# Patient Record
Sex: FEMALE | Race: WHITE | Employment: PART TIME | URBAN - METROPOLITAN AREA
[De-identification: names, ages, dates, MRNs, and addresses within clinical notes are randomized per-mention and may not be internally consistent; named-entity substitution may affect disease eponyms.]

---

## 2023-11-15 ENCOUNTER — HOSPITAL ENCOUNTER (EMERGENCY)
Facility: CLINIC | Age: 23
Discharge: HOME | End: 2023-11-15
Attending: EMERGENCY MEDICINE
Payer: COMMERCIAL

## 2023-11-15 ENCOUNTER — APPOINTMENT (OUTPATIENT)
Dept: GENERAL RADIOLOGY | Facility: CLINIC | Age: 23
End: 2023-11-15
Attending: EMERGENCY MEDICINE
Payer: COMMERCIAL

## 2023-11-15 VITALS
BODY MASS INDEX: 49.09 KG/M2 | HEIGHT: 61 IN | SYSTOLIC BLOOD PRESSURE: 149 MMHG | WEIGHT: 260 LBS | RESPIRATION RATE: 18 BRPM | OXYGEN SATURATION: 98 % | HEART RATE: 121 BPM | DIASTOLIC BLOOD PRESSURE: 90 MMHG | TEMPERATURE: 97.3 F

## 2023-11-15 DIAGNOSIS — S89.82XA HYPEREXTENSION INJURY OF KNEE, LEFT, INITIAL ENCOUNTER: Primary | ICD-10-CM

## 2023-11-15 PROCEDURE — 73562 X-RAY EXAM OF KNEE 3: CPT

## 2023-11-15 PROCEDURE — 6370000000 HC RX 637 (ALT 250 FOR IP): Performed by: EMERGENCY MEDICINE

## 2023-11-15 PROCEDURE — 99283 EMERGENCY DEPT VISIT LOW MDM: CPT

## 2023-11-15 RX ORDER — IBUPROFEN 200 MG
400 TABLET ORAL ONCE
Status: COMPLETED | OUTPATIENT
Start: 2023-11-15 | End: 2023-11-15

## 2023-11-15 RX ORDER — ETONOGESTREL AND ETHINYL ESTRADIOL VAGINAL .015; .12 MG/D; MG/D
1 RING VAGINAL SEE ADMIN INSTRUCTIONS
COMMUNITY

## 2023-11-15 RX ADMIN — IBUPROFEN 400 MG: 200 TABLET, FILM COATED ORAL at 22:27

## 2023-11-15 ASSESSMENT — PAIN SCALES - GENERAL
PAINLEVEL_OUTOF10: 8
PAINLEVEL_OUTOF10: 8

## 2023-11-15 ASSESSMENT — PAIN DESCRIPTION - LOCATION: LOCATION: KNEE

## 2023-11-15 ASSESSMENT — PAIN DESCRIPTION - ORIENTATION: ORIENTATION: LEFT

## 2023-11-15 ASSESSMENT — PAIN - FUNCTIONAL ASSESSMENT: PAIN_FUNCTIONAL_ASSESSMENT: 0-10

## 2023-11-16 NOTE — DISCHARGE INSTRUCTIONS
Use Ace wrap when walking around or sleeping. Must follow-up with your company's work injury provider right away for evaluation as you may need to see an orthopedic specialist.  I cannot be sure that you have not injured any structures in your knee such as your cartilage or your MCL. Is Motrin for pain. You will be on work restrictions involving sitting only type job until fully healed and cleared.

## 2023-11-16 NOTE — ED NOTES
Patient came in ambulatory from work. At 2050 patient fell at work because the floor was wet. Patient came in here due to pain in her left knee. Patient came in limping but did not need assistive device.       Lexie Veronica RN  11/15/23 1938

## 2023-12-08 SDOH — HEALTH STABILITY: PHYSICAL HEALTH: ON AVERAGE, HOW MANY DAYS PER WEEK DO YOU ENGAGE IN MODERATE TO STRENUOUS EXERCISE (LIKE A BRISK WALK)?: 3 DAYS

## 2023-12-11 ENCOUNTER — OFFICE VISIT (OUTPATIENT)
Dept: FAMILY MEDICINE CLINIC | Age: 23
End: 2023-12-11
Payer: COMMERCIAL

## 2023-12-11 ENCOUNTER — HOSPITAL ENCOUNTER (OUTPATIENT)
Age: 23
Discharge: HOME | End: 2023-12-11
Payer: COMMERCIAL

## 2023-12-11 VITALS
TEMPERATURE: 97.3 F | WEIGHT: 293 LBS | BODY MASS INDEX: 53.92 KG/M2 | OXYGEN SATURATION: 99 % | HEIGHT: 62 IN | HEART RATE: 99 BPM | DIASTOLIC BLOOD PRESSURE: 74 MMHG | SYSTOLIC BLOOD PRESSURE: 122 MMHG | RESPIRATION RATE: 16 BRPM

## 2023-12-11 DIAGNOSIS — Z00.00 PREVENTATIVE HEALTH CARE: Primary | ICD-10-CM

## 2023-12-11 DIAGNOSIS — Z00.00 PREVENTATIVE HEALTH CARE: ICD-10-CM

## 2023-12-11 DIAGNOSIS — R63.5 WEIGHT GAIN: ICD-10-CM

## 2023-12-11 DIAGNOSIS — E55.9 VITAMIN D DEFICIENCY: ICD-10-CM

## 2023-12-11 PROBLEM — L70.9 ACNE: Status: ACTIVE | Noted: 2017-01-27

## 2023-12-11 LAB
ALBUMIN SERPL-MCNC: 4 G/DL (ref 3.5–5.2)
ALBUMIN/GLOB SERPL: 1.1 {RATIO} (ref 1–2.5)
ALP SERPL-CCNC: 66 U/L (ref 35–104)
ALT SERPL-CCNC: 11 U/L (ref 5–33)
ANION GAP SERPL CALCULATED.3IONS-SCNC: 10 MMOL/L (ref 9–17)
AST SERPL-CCNC: 13 U/L
BILIRUB SERPL-MCNC: <0.1 MG/DL (ref 0.3–1.2)
BUN SERPL-MCNC: 10 MG/DL (ref 6–20)
CALCIUM SERPL-MCNC: 9.3 MG/DL (ref 8.6–10.4)
CHLORIDE SERPL-SCNC: 106 MMOL/L (ref 98–107)
CHOLEST SERPL-MCNC: 126 MG/DL
CHOLESTEROL/HDL RATIO: 3.2
CO2 SERPL-SCNC: 24 MMOL/L (ref 20–31)
CREAT SERPL-MCNC: 0.7 MG/DL (ref 0.5–0.9)
ERYTHROCYTE [DISTWIDTH] IN BLOOD BY AUTOMATED COUNT: 13.3 % (ref 11.8–14.4)
EST. AVERAGE GLUCOSE BLD GHB EST-MCNC: 103 MG/DL
GFR SERPL CREATININE-BSD FRML MDRD: >60 ML/MIN/1.73M2
GLUCOSE SERPL-MCNC: 98 MG/DL (ref 70–99)
HBA1C MFR BLD: 5.2 % (ref 4–6)
HCT VFR BLD AUTO: 41.1 % (ref 36.3–47.1)
HDLC SERPL-MCNC: 39 MG/DL
HGB BLD-MCNC: 13.5 G/DL (ref 11.9–15.1)
LDLC SERPL CALC-MCNC: 66 MG/DL (ref 0–130)
MCH RBC QN AUTO: 27.9 PG (ref 25.2–33.5)
MCHC RBC AUTO-ENTMCNC: 32.8 G/DL (ref 28.4–34.8)
MCV RBC AUTO: 84.9 FL (ref 82.6–102.9)
NRBC BLD-RTO: 0 PER 100 WBC
PLATELET # BLD AUTO: 402 K/UL (ref 138–453)
PMV BLD AUTO: 9.7 FL (ref 8.1–13.5)
POTASSIUM SERPL-SCNC: 4 MMOL/L (ref 3.7–5.3)
PROT SERPL-MCNC: 7.6 G/DL (ref 6.4–8.3)
RBC # BLD AUTO: 4.84 M/UL (ref 3.95–5.11)
SODIUM SERPL-SCNC: 140 MMOL/L (ref 135–144)
TRIGL SERPL-MCNC: 104 MG/DL
TSH SERPL DL<=0.05 MIU/L-ACNC: 1.73 UIU/ML (ref 0.3–5)
WBC OTHER # BLD: 7.8 K/UL (ref 3.5–11.3)

## 2023-12-11 PROCEDURE — 36415 COLL VENOUS BLD VENIPUNCTURE: CPT

## 2023-12-11 PROCEDURE — 80053 COMPREHEN METABOLIC PANEL: CPT

## 2023-12-11 PROCEDURE — G8484 FLU IMMUNIZE NO ADMIN: HCPCS | Performed by: NURSE PRACTITIONER

## 2023-12-11 PROCEDURE — 84443 ASSAY THYROID STIM HORMONE: CPT

## 2023-12-11 PROCEDURE — 83036 HEMOGLOBIN GLYCOSYLATED A1C: CPT

## 2023-12-11 PROCEDURE — 80061 LIPID PANEL: CPT

## 2023-12-11 PROCEDURE — 85027 COMPLETE CBC AUTOMATED: CPT

## 2023-12-11 PROCEDURE — 82306 VITAMIN D 25 HYDROXY: CPT

## 2023-12-11 PROCEDURE — 99385 PREV VISIT NEW AGE 18-39: CPT | Performed by: NURSE PRACTITIONER

## 2023-12-11 SDOH — ECONOMIC STABILITY: INCOME INSECURITY: HOW HARD IS IT FOR YOU TO PAY FOR THE VERY BASICS LIKE FOOD, HOUSING, MEDICAL CARE, AND HEATING?: NOT HARD AT ALL

## 2023-12-11 SDOH — ECONOMIC STABILITY: FOOD INSECURITY: WITHIN THE PAST 12 MONTHS, YOU WORRIED THAT YOUR FOOD WOULD RUN OUT BEFORE YOU GOT MONEY TO BUY MORE.: NEVER TRUE

## 2023-12-11 SDOH — ECONOMIC STABILITY: HOUSING INSECURITY
IN THE LAST 12 MONTHS, WAS THERE A TIME WHEN YOU DID NOT HAVE A STEADY PLACE TO SLEEP OR SLEPT IN A SHELTER (INCLUDING NOW)?: NO

## 2023-12-11 SDOH — ECONOMIC STABILITY: FOOD INSECURITY: WITHIN THE PAST 12 MONTHS, THE FOOD YOU BOUGHT JUST DIDN'T LAST AND YOU DIDN'T HAVE MONEY TO GET MORE.: NEVER TRUE

## 2023-12-11 ASSESSMENT — PATIENT HEALTH QUESTIONNAIRE - PHQ9
SUM OF ALL RESPONSES TO PHQ QUESTIONS 1-9: 0
1. LITTLE INTEREST OR PLEASURE IN DOING THINGS: 0
SUM OF ALL RESPONSES TO PHQ QUESTIONS 1-9: 0
SUM OF ALL RESPONSES TO PHQ QUESTIONS 1-9: 0
2. FEELING DOWN, DEPRESSED OR HOPELESS: 0
SUM OF ALL RESPONSES TO PHQ QUESTIONS 1-9: 0
SUM OF ALL RESPONSES TO PHQ9 QUESTIONS 1 & 2: 0

## 2023-12-11 ASSESSMENT — ENCOUNTER SYMPTOMS
DIARRHEA: 0
NAUSEA: 0
ABDOMINAL DISTENTION: 0
CONSTIPATION: 0
SORE THROAT: 0
SHORTNESS OF BREATH: 0
RHINORRHEA: 0
COLOR CHANGE: 0
CHEST TIGHTNESS: 0
BACK PAIN: 0
COUGH: 0
ABDOMINAL PAIN: 0

## 2023-12-11 NOTE — PROGRESS NOTES
Basilia Ellis, APRN-CNP  [address] [facility]  [address] [address] [facility], [address] [facility] [address]  Dept: [phone]  Dept Fax: [phone]     Patient ID: Jessy Haji is a 21 y.o. female. HPI    Jessy Haji is a 21 y.o. female New patient who presents to the office today for a first visit and to establish a relationship with a new primary care provider. Previous PCP: Marline Garcia with [facility] last seen: while    Today, the patient complains of weight gain. She has been trying to lose weight but continues to gain weight, she would like to try something to help her get going, as she feels she is at a stand still. She is trying to eat better. Preventative care, female:  Last PAP: 9/2023  Nicotine use: never  Alcohol use: yes, socially   Drug use: never  Dental exam: sept, 2023  Eye exam: glasses, a while     Specialists:  Michael    Previous office notes, labs, imaging and hospital records were reviewed prior to and during encounter. The patient's past medical, surgical, social, and family history as well as her current medications and allergies were reviewed as documented in today's encounter by JULIAN Bergeron. Current Outpatient Medications on File Prior to Visit   Medication Sig Dispense Refill    etonogestrel-ethinyl estradiol (NUVARING) 0.12-0.015 MG/24HR vaginal ring Place 1 each vaginally See Admin Instructions       No current facility-administered medications on file prior to visit. Subjective:     Review of Systems   Constitutional:  Negative for activity change, fatigue and fever. HENT:  Negative for congestion, ear pain, rhinorrhea and sore throat. Respiratory:  Negative for cough, chest tightness and shortness of breath. Cardiovascular:  Negative for chest pain and palpitations. Gastrointestinal:  Negative for abdominal distention, abdominal pain, constipation, diarrhea and nausea.    Endocrine: Negative for polydipsia, polyphagia and

## 2023-12-11 NOTE — PATIENT INSTRUCTIONS
- increase protein intake, smaller portion sizes, 4-6 small meals daily. - Lifestyle changes: Decrease fats, sugars, carbohydrates, and increase routine exercise, try to get 150 minutes of aerobic activity a week.

## 2023-12-13 LAB — 25(OH)D3 SERPL-MCNC: 19.9 NG/ML (ref 30–100)

## 2023-12-13 RX ORDER — ERGOCALCIFEROL 1.25 MG/1
50000 CAPSULE ORAL WEEKLY
Qty: 4 CAPSULE | Refills: 3 | Status: SHIPPED | OUTPATIENT
Start: 2023-12-13

## 2023-12-29 NOTE — PROGRESS NOTES
Amelia Alvarez, APRN-CNP  [facility]  [facility] MEDICINE  [address], [address]  [facility] [address]  Dept: [phone]  Dept Fax: [phone]     Patient ID: Barb Burk is a 23 y.o. female.    HPI    Pt here today for BP and weight check wanting to start adipex.  A refill of the medication is needed today. Pt denies any fever or chills.  Pt today denies any HA, chest pain, or SOB.  Pt denies any N/V/D/C or abdominal pain.   Otherwise patient is doing well on current tx and voices no other concerns today.     The patient's past medical, surgical, social, and family history as well as his current medications and allergies were reviewed as documented in today's encounter by JULIAN Ludwig.    My previous office notes, labs and diagnostic studies were reviewed prior to and during encounter.    Current Outpatient Medications on File Prior to Visit   Medication Sig Dispense Refill    vitamin D (ERGOCALCIFEROL) 1.25 MG (74423 UT) CAPS capsule Take 1 capsule by mouth once a week 4 capsule 3    etonogestrel-ethinyl estradiol (NUVARING) 0.12-0.015 MG/24HR vaginal ring Place 1 each vaginally See Admin Instructions       No current facility-administered medications on file prior to visit.        Subjective:     Review of Systems   Constitutional:  Negative for activity change, fatigue and fever.   HENT:  Negative for congestion, ear pain, rhinorrhea and sore throat.    Respiratory:  Negative for cough, chest tightness and shortness of breath.    Cardiovascular:  Negative for chest pain and palpitations.   Gastrointestinal:  Negative for abdominal distention, abdominal pain, constipation, diarrhea and nausea.   Endocrine: Negative for polydipsia, polyphagia and polyuria.   Genitourinary:  Negative for difficulty urinating and dysuria.   Musculoskeletal:  Negative for arthralgias, back pain and myalgias.   Skin:  Negative for color change and rash.   Neurological:  Negative

## 2024-01-03 ENCOUNTER — OFFICE VISIT (OUTPATIENT)
Dept: FAMILY MEDICINE CLINIC | Age: 24
End: 2024-01-03
Payer: COMMERCIAL

## 2024-01-03 VITALS
DIASTOLIC BLOOD PRESSURE: 76 MMHG | SYSTOLIC BLOOD PRESSURE: 124 MMHG | WEIGHT: 293 LBS | OXYGEN SATURATION: 97 % | BODY MASS INDEX: 53.96 KG/M2 | RESPIRATION RATE: 16 BRPM | HEART RATE: 82 BPM | TEMPERATURE: 98.1 F

## 2024-01-03 DIAGNOSIS — R63.5 WEIGHT GAIN: ICD-10-CM

## 2024-01-03 PROCEDURE — G8417 CALC BMI ABV UP PARAM F/U: HCPCS | Performed by: NURSE PRACTITIONER

## 2024-01-03 PROCEDURE — G8427 DOCREV CUR MEDS BY ELIG CLIN: HCPCS | Performed by: NURSE PRACTITIONER

## 2024-01-03 PROCEDURE — 1036F TOBACCO NON-USER: CPT | Performed by: NURSE PRACTITIONER

## 2024-01-03 PROCEDURE — 99213 OFFICE O/P EST LOW 20 MIN: CPT | Performed by: NURSE PRACTITIONER

## 2024-01-03 PROCEDURE — G8484 FLU IMMUNIZE NO ADMIN: HCPCS | Performed by: NURSE PRACTITIONER

## 2024-01-03 RX ORDER — PHENTERMINE HYDROCHLORIDE 37.5 MG/1
37.5 CAPSULE ORAL EVERY MORNING
Qty: 30 CAPSULE | Refills: 0 | Status: SHIPPED | OUTPATIENT
Start: 2024-01-03 | End: 2024-02-02

## 2024-01-03 ASSESSMENT — ENCOUNTER SYMPTOMS
CONSTIPATION: 0
NAUSEA: 0
SORE THROAT: 0
CHEST TIGHTNESS: 0
COUGH: 0
ABDOMINAL DISTENTION: 0
ABDOMINAL PAIN: 0
BACK PAIN: 0
RHINORRHEA: 0
DIARRHEA: 0
SHORTNESS OF BREATH: 0
COLOR CHANGE: 0

## 2024-01-03 ASSESSMENT — PATIENT HEALTH QUESTIONNAIRE - PHQ9
SUM OF ALL RESPONSES TO PHQ QUESTIONS 1-9: 0
SUM OF ALL RESPONSES TO PHQ9 QUESTIONS 1 & 2: 0
SUM OF ALL RESPONSES TO PHQ QUESTIONS 1-9: 0
2. FEELING DOWN, DEPRESSED OR HOPELESS: 0
1. LITTLE INTEREST OR PLEASURE IN DOING THINGS: 0

## 2024-02-02 ENCOUNTER — OFFICE VISIT (OUTPATIENT)
Dept: FAMILY MEDICINE CLINIC | Age: 24
End: 2024-02-02

## 2024-02-02 VITALS
SYSTOLIC BLOOD PRESSURE: 126 MMHG | TEMPERATURE: 97.4 F | HEART RATE: 81 BPM | DIASTOLIC BLOOD PRESSURE: 76 MMHG | BODY MASS INDEX: 50.85 KG/M2 | WEIGHT: 278 LBS | RESPIRATION RATE: 16 BRPM | OXYGEN SATURATION: 98 %

## 2024-02-02 DIAGNOSIS — R63.5 WEIGHT GAIN: ICD-10-CM

## 2024-02-02 RX ORDER — PHENTERMINE HYDROCHLORIDE 37.5 MG/1
37.5 CAPSULE ORAL EVERY MORNING
Qty: 30 CAPSULE | Refills: 0 | Status: SHIPPED | OUTPATIENT
Start: 2024-02-02 | End: 2024-03-03

## 2024-02-02 ASSESSMENT — ENCOUNTER SYMPTOMS
ABDOMINAL DISTENTION: 0
RHINORRHEA: 0
COUGH: 0
SORE THROAT: 0
CHEST TIGHTNESS: 0
BACK PAIN: 0
CONSTIPATION: 1
SHORTNESS OF BREATH: 0
NAUSEA: 0
COLOR CHANGE: 0
DIARRHEA: 0
ABDOMINAL PAIN: 0

## 2024-02-02 NOTE — PROGRESS NOTES
abdominal distention, abdominal pain, diarrhea and nausea.   Endocrine: Negative for polydipsia, polyphagia and polyuria.   Genitourinary:  Negative for difficulty urinating and dysuria.   Musculoskeletal:  Negative for arthralgias, back pain and myalgias.   Skin:  Negative for color change and rash.   Neurological:  Negative for dizziness, weakness, light-headedness and headaches.   Hematological:  Negative for adenopathy.   Psychiatric/Behavioral:  Negative for agitation and behavioral problems. The patient is not nervous/anxious.      Vitals:    02/02/24 0826   BP: 126/76   Pulse: 81   Resp: 16   Temp: 97.4 °F (36.3 °C)   SpO2: 98%       Objective:     Physical Exam  Vitals reviewed.   Constitutional:       General: She is not in acute distress.     Appearance: Normal appearance.   HENT:      Head: Normocephalic and atraumatic.      Right Ear: External ear normal.      Left Ear: External ear normal.      Nose: Nose normal.      Mouth/Throat:      Mouth: Mucous membranes are moist.      Pharynx: No oropharyngeal exudate or posterior oropharyngeal erythema.   Eyes:      Extraocular Movements: Extraocular movements intact.      Conjunctiva/sclera: Conjunctivae normal.      Pupils: Pupils are equal, round, and reactive to light.   Cardiovascular:      Rate and Rhythm: Normal rate and regular rhythm.      Pulses: Normal pulses.      Heart sounds: Normal heart sounds. No murmur heard.  Pulmonary:      Effort: Pulmonary effort is normal. No respiratory distress.      Breath sounds: Normal breath sounds. No wheezing or rales.   Abdominal:      General: Bowel sounds are normal. There is no distension.      Palpations: Abdomen is soft.      Tenderness: There is no abdominal tenderness.   Musculoskeletal:         General: Normal range of motion.      Cervical back: Normal range of motion.      Right lower leg: No edema.      Left lower leg: No edema.   Lymphadenopathy:      Cervical: No cervical adenopathy.   Skin:

## 2024-03-01 ENCOUNTER — OFFICE VISIT (OUTPATIENT)
Dept: FAMILY MEDICINE CLINIC | Age: 24
End: 2024-03-01
Payer: COMMERCIAL

## 2024-03-01 VITALS
WEIGHT: 270 LBS | BODY MASS INDEX: 49.69 KG/M2 | TEMPERATURE: 97.5 F | HEART RATE: 85 BPM | OXYGEN SATURATION: 99 % | SYSTOLIC BLOOD PRESSURE: 122 MMHG | HEIGHT: 62 IN | DIASTOLIC BLOOD PRESSURE: 80 MMHG

## 2024-03-01 DIAGNOSIS — R63.5 WEIGHT GAIN: ICD-10-CM

## 2024-03-01 PROCEDURE — 99213 OFFICE O/P EST LOW 20 MIN: CPT | Performed by: NURSE PRACTITIONER

## 2024-03-01 PROCEDURE — G8427 DOCREV CUR MEDS BY ELIG CLIN: HCPCS | Performed by: NURSE PRACTITIONER

## 2024-03-01 PROCEDURE — 1036F TOBACCO NON-USER: CPT | Performed by: NURSE PRACTITIONER

## 2024-03-01 PROCEDURE — G8484 FLU IMMUNIZE NO ADMIN: HCPCS | Performed by: NURSE PRACTITIONER

## 2024-03-01 PROCEDURE — G8417 CALC BMI ABV UP PARAM F/U: HCPCS | Performed by: NURSE PRACTITIONER

## 2024-03-01 RX ORDER — PHENTERMINE HYDROCHLORIDE 37.5 MG/1
37.5 CAPSULE ORAL EVERY MORNING
Qty: 30 CAPSULE | Refills: 0 | Status: SHIPPED | OUTPATIENT
Start: 2024-03-01 | End: 2024-03-31

## 2024-03-01 ASSESSMENT — ENCOUNTER SYMPTOMS
DIARRHEA: 0
COUGH: 0
SHORTNESS OF BREATH: 0
CHEST TIGHTNESS: 0
RHINORRHEA: 0
ABDOMINAL DISTENTION: 0
ABDOMINAL PAIN: 0
COLOR CHANGE: 0
CONSTIPATION: 1
BACK PAIN: 0
NAUSEA: 0
SORE THROAT: 0

## 2024-03-01 NOTE — PROGRESS NOTES
Amelia Alvarez, APRN-CNP  [facility]  [facility] MEDICINE  [address], [address]  [facility] [address]  Dept: [phone]  Dept Fax: [phone]     Patient ID: Barb Burk is a 23 y.o. female.    HPI    Pt here today for BP and weight check while being on Adipex.  A refill of the medication is needed today. Pt denies any fever or chills.  Pt today denies any HA, chest pain, or SOB.  Pt denies any N/V/D/C or abdominal pain. This is month 2 since starting. Since her last visit, she has lost 8 lbs for a cumulative total of 25 lbs.    Otherwise patient is doing well on current tx and voices no other concerns today.     The patient's past medical, surgical, social, and family history as well as his current medications and allergies were reviewed as documented in today's encounter by JULIAN Ludwig.    My previous office notes, labs and diagnostic studies were reviewed prior to and during encounter.    Current Outpatient Medications on File Prior to Visit   Medication Sig Dispense Refill    phentermine (ADIPEX-P) 37.5 MG capsule Take 1 capsule by mouth every morning for 30 days. Max Daily Amount: 37.5 mg 30 capsule 0    vitamin D (ERGOCALCIFEROL) 1.25 MG (78347 UT) CAPS capsule Take 1 capsule by mouth once a week 4 capsule 3    etonogestrel-ethinyl estradiol (NUVARING) 0.12-0.015 MG/24HR vaginal ring Place 1 each vaginally See Admin Instructions       No current facility-administered medications on file prior to visit.        Subjective:     Review of Systems   Constitutional:  Negative for activity change, fatigue and fever.   HENT:  Negative for congestion, ear pain, rhinorrhea and sore throat.    Respiratory:  Negative for cough, chest tightness and shortness of breath.    Cardiovascular:  Negative for chest pain and palpitations.   Gastrointestinal:  Positive for constipation (improved, miralax as needed). Negative for abdominal distention, abdominal pain, diarrhea

## 2024-03-05 ENCOUNTER — HOSPITAL ENCOUNTER (EMERGENCY)
Age: 24
Discharge: HOME | End: 2024-03-05
Attending: EMERGENCY MEDICINE
Payer: COMMERCIAL

## 2024-03-05 ENCOUNTER — APPOINTMENT (OUTPATIENT)
Dept: CT IMAGING | Age: 24
End: 2024-03-05
Payer: COMMERCIAL

## 2024-03-05 VITALS
HEIGHT: 62 IN | SYSTOLIC BLOOD PRESSURE: 141 MMHG | BODY MASS INDEX: 44.16 KG/M2 | OXYGEN SATURATION: 100 % | WEIGHT: 240 LBS | RESPIRATION RATE: 18 BRPM | DIASTOLIC BLOOD PRESSURE: 70 MMHG | HEART RATE: 80 BPM

## 2024-03-05 DIAGNOSIS — R10.84 GENERALIZED ABDOMINAL PAIN: Primary | ICD-10-CM

## 2024-03-05 DIAGNOSIS — K59.00 CONSTIPATION, UNSPECIFIED CONSTIPATION TYPE: ICD-10-CM

## 2024-03-05 LAB
ALBUMIN SERPL-MCNC: 4.1 G/DL (ref 3.5–5.2)
ALBUMIN/GLOB SERPL: 1.3 {RATIO} (ref 1–2.5)
ALP SERPL-CCNC: 76 U/L (ref 35–104)
ALT SERPL-CCNC: 20 U/L (ref 5–33)
ANION GAP SERPL CALCULATED.3IONS-SCNC: 7 MMOL/L (ref 9–17)
AST SERPL-CCNC: 16 U/L
BASOPHILS # BLD: 0 K/UL (ref 0–0.2)
BASOPHILS NFR BLD: 0 % (ref 0–2)
BILIRUB SERPL-MCNC: 0.2 MG/DL (ref 0.3–1.2)
BILIRUB UR QL STRIP: NEGATIVE
BUN SERPL-MCNC: 10 MG/DL (ref 6–20)
CALCIUM SERPL-MCNC: 9.5 MG/DL (ref 8.6–10.4)
CHLORIDE SERPL-SCNC: 106 MMOL/L (ref 98–107)
CLARITY UR: CLEAR
CO2 SERPL-SCNC: 26 MMOL/L (ref 20–31)
COLOR UR: YELLOW
COMMENT: NORMAL
CREAT SERPL-MCNC: 0.7 MG/DL (ref 0.5–0.9)
EOSINOPHIL # BLD: 0.2 K/UL (ref 0–0.4)
EOSINOPHILS RELATIVE PERCENT: 2 % (ref 1–4)
ERYTHROCYTE [DISTWIDTH] IN BLOOD BY AUTOMATED COUNT: 14.3 % (ref 12.5–15.4)
GFR SERPL CREATININE-BSD FRML MDRD: >60 ML/MIN/1.73M2
GLUCOSE SERPL-MCNC: 100 MG/DL (ref 70–99)
GLUCOSE UR STRIP-MCNC: NEGATIVE MG/DL
HCG UR QL: NEGATIVE
HCT VFR BLD AUTO: 38.2 % (ref 36–46)
HGB BLD-MCNC: 13.3 G/DL (ref 12–16)
HGB UR QL STRIP.AUTO: NEGATIVE
KETONES UR STRIP-MCNC: NEGATIVE MG/DL
LEUKOCYTE ESTERASE UR QL STRIP: NEGATIVE
LIPASE SERPL-CCNC: 27 U/L (ref 13–60)
LYMPHOCYTES NFR BLD: 2.6 K/UL (ref 1–4.8)
LYMPHOCYTES RELATIVE PERCENT: 28 % (ref 24–44)
MCH RBC QN AUTO: 29.2 PG (ref 26–34)
MCHC RBC AUTO-ENTMCNC: 34.8 G/DL (ref 31–37)
MCV RBC AUTO: 83.7 FL (ref 80–100)
MONOCYTES NFR BLD: 0.7 K/UL (ref 0.1–1.2)
MONOCYTES NFR BLD: 8 % (ref 2–11)
NEUTROPHILS NFR BLD: 62 % (ref 36–66)
NEUTS SEG NFR BLD: 5.9 K/UL (ref 1.8–7.7)
NITRITE UR QL STRIP: NEGATIVE
PH UR STRIP: 7 [PH] (ref 5–8)
PLATELET # BLD AUTO: 354 K/UL (ref 140–450)
PMV BLD AUTO: 7.4 FL (ref 6–12)
POTASSIUM SERPL-SCNC: 4 MMOL/L (ref 3.7–5.3)
PROT SERPL-MCNC: 7.3 G/DL (ref 6.4–8.3)
PROT UR STRIP-MCNC: NEGATIVE MG/DL
RBC # BLD AUTO: 4.56 M/UL (ref 4–5.2)
SODIUM SERPL-SCNC: 139 MMOL/L (ref 135–144)
SP GR UR STRIP: 1.02 (ref 1–1.03)
UROBILINOGEN UR STRIP-ACNC: NORMAL EU/DL (ref 0–1)
WBC OTHER # BLD: 9.5 K/UL (ref 3.5–11)

## 2024-03-05 PROCEDURE — 81003 URINALYSIS AUTO W/O SCOPE: CPT

## 2024-03-05 PROCEDURE — 2580000003 HC RX 258: Performed by: EMERGENCY MEDICINE

## 2024-03-05 PROCEDURE — 74176 CT ABD & PELVIS W/O CONTRAST: CPT

## 2024-03-05 PROCEDURE — 85025 COMPLETE CBC W/AUTO DIFF WBC: CPT

## 2024-03-05 PROCEDURE — 6360000002 HC RX W HCPCS: Performed by: EMERGENCY MEDICINE

## 2024-03-05 PROCEDURE — 81025 URINE PREGNANCY TEST: CPT

## 2024-03-05 PROCEDURE — 80053 COMPREHEN METABOLIC PANEL: CPT

## 2024-03-05 PROCEDURE — 36415 COLL VENOUS BLD VENIPUNCTURE: CPT

## 2024-03-05 PROCEDURE — 99284 EMERGENCY DEPT VISIT MOD MDM: CPT | Performed by: EMERGENCY MEDICINE

## 2024-03-05 PROCEDURE — 83690 ASSAY OF LIPASE: CPT

## 2024-03-05 PROCEDURE — 96374 THER/PROPH/DIAG INJ IV PUSH: CPT | Performed by: EMERGENCY MEDICINE

## 2024-03-05 RX ORDER — SODIUM CHLORIDE 0.9 % (FLUSH) 0.9 %
3 SYRINGE (ML) INJECTION EVERY 8 HOURS
Status: DISCONTINUED | OUTPATIENT
Start: 2024-03-05 | End: 2024-03-05 | Stop reason: HOSPADM

## 2024-03-05 RX ORDER — ONDANSETRON 4 MG/1
4 TABLET, FILM COATED ORAL EVERY 8 HOURS PRN
Qty: 20 TABLET | Refills: 0 | Status: SHIPPED | OUTPATIENT
Start: 2024-03-05

## 2024-03-05 RX ORDER — KETOROLAC TROMETHAMINE 15 MG/ML
15 INJECTION, SOLUTION INTRAMUSCULAR; INTRAVENOUS ONCE
Status: COMPLETED | OUTPATIENT
Start: 2024-03-05 | End: 2024-03-05

## 2024-03-05 RX ADMIN — SODIUM CHLORIDE, PRESERVATIVE FREE 3 ML: 5 INJECTION INTRAVENOUS at 01:59

## 2024-03-05 RX ADMIN — KETOROLAC TROMETHAMINE 15 MG: 15 INJECTION, SOLUTION INTRAMUSCULAR; INTRAVENOUS at 01:59

## 2024-03-05 ASSESSMENT — ENCOUNTER SYMPTOMS
EYE REDNESS: 0
COLOR CHANGE: 0
EYE PAIN: 0
EYE DISCHARGE: 0
COUGH: 0
STRIDOR: 0
NAUSEA: 1
WHEEZING: 0
SORE THROAT: 0
DIARRHEA: 0
ABDOMINAL PAIN: 1
CONSTIPATION: 0
VOMITING: 0
SHORTNESS OF BREATH: 0

## 2024-03-05 ASSESSMENT — PAIN SCALES - GENERAL: PAINLEVEL_OUTOF10: 8

## 2024-03-05 ASSESSMENT — PAIN DESCRIPTION - LOCATION: LOCATION: ABDOMEN

## 2024-03-05 ASSESSMENT — PAIN - FUNCTIONAL ASSESSMENT: PAIN_FUNCTIONAL_ASSESSMENT: 0-10

## 2024-03-05 NOTE — ED PROVIDER NOTES
[facility] EMERGENCY DEPARTMENT  EMERGENCY DEPARTMENT ENCOUNTER      Pt Name: Barb Burk  MRN: [id number]  Birthdate 2000  Date of evaluation: 3/5/2024  Provider: Korina Judge MD    CHIEF COMPLAINT       Chief Complaint   Patient presents with    Abdominal Pain     Pt reports RLQ pain that started just PTA, reports nausea, denies emesis/ diarrhea/ fever        HISTORY OF PRESENT ILLNESS  (Location/Symptom, Timing/Onset, Context/Setting, Quality, Duration, Modifying Factors, Severity.)   Barb Burk is a 23 y.o. female who presents to the emergency department complaining of right lower quadrant pain that started just prior to arrival tonight.  She reports she is feeling quite nauseous with it but has had no vomiting.  She is not experiencing any fever or diarrhea.  She relates she has not had anything quite like this before and that is what made them concerned.  Nothing seems to be helping or making it worse.  No problems with urination.    Nursing Notes were reviewed.    REVIEW OF SYSTEMS    (2-9 systems for level 4, 10 or more for level 5)     Review of Systems   Constitutional:  Negative for activity change, appetite change, chills, fatigue and fever.   HENT:  Negative for congestion, ear pain and sore throat.    Eyes:  Negative for pain, discharge and redness.   Respiratory:  Negative for cough, shortness of breath, wheezing and stridor.    Cardiovascular:  Negative for chest pain.   Gastrointestinal:  Positive for abdominal pain and nausea. Negative for constipation, diarrhea and vomiting.   Genitourinary:  Negative for decreased urine volume and difficulty urinating.   Musculoskeletal:  Negative for arthralgias and myalgias.   Skin:  Negative for color change and rash.   Neurological:  Negative for dizziness, weakness and headaches.   Psychiatric/Behavioral:  Negative for behavioral problems and confusion.        Except as noted above the remainder of the review of systems

## 2024-03-06 ENCOUNTER — HOSPITAL ENCOUNTER (OUTPATIENT)
Dept: ULTRASOUND IMAGING | Facility: CLINIC | Age: 24
Discharge: HOME | End: 2024-03-08
Payer: COMMERCIAL

## 2024-03-06 DIAGNOSIS — R10.84 GENERALIZED ABDOMINAL PAIN: ICD-10-CM

## 2024-03-06 PROCEDURE — 76705 ECHO EXAM OF ABDOMEN: CPT

## 2024-03-29 ENCOUNTER — OFFICE VISIT (OUTPATIENT)
Dept: FAMILY MEDICINE CLINIC | Age: 24
End: 2024-03-29
Payer: COMMERCIAL

## 2024-03-29 VITALS
WEIGHT: 266 LBS | OXYGEN SATURATION: 95 % | HEART RATE: 60 BPM | RESPIRATION RATE: 16 BRPM | DIASTOLIC BLOOD PRESSURE: 76 MMHG | SYSTOLIC BLOOD PRESSURE: 134 MMHG | BODY MASS INDEX: 48.65 KG/M2 | TEMPERATURE: 98 F

## 2024-03-29 DIAGNOSIS — R10.31 RIGHT LOWER QUADRANT PAIN: Primary | ICD-10-CM

## 2024-03-29 DIAGNOSIS — R63.5 WEIGHT GAIN: ICD-10-CM

## 2024-03-29 PROCEDURE — G8427 DOCREV CUR MEDS BY ELIG CLIN: HCPCS | Performed by: NURSE PRACTITIONER

## 2024-03-29 PROCEDURE — 99214 OFFICE O/P EST MOD 30 MIN: CPT | Performed by: NURSE PRACTITIONER

## 2024-03-29 PROCEDURE — G8484 FLU IMMUNIZE NO ADMIN: HCPCS | Performed by: NURSE PRACTITIONER

## 2024-03-29 PROCEDURE — 1036F TOBACCO NON-USER: CPT | Performed by: NURSE PRACTITIONER

## 2024-03-29 PROCEDURE — G8417 CALC BMI ABV UP PARAM F/U: HCPCS | Performed by: NURSE PRACTITIONER

## 2024-03-29 RX ORDER — PHENTERMINE HYDROCHLORIDE 37.5 MG/1
37.5 CAPSULE ORAL EVERY MORNING
Qty: 30 CAPSULE | Refills: 2 | Status: SHIPPED | OUTPATIENT
Start: 2024-03-29 | End: 2024-06-27

## 2024-03-29 ASSESSMENT — ENCOUNTER SYMPTOMS
ABDOMINAL DISTENTION: 0
DIARRHEA: 0
RHINORRHEA: 0
COLOR CHANGE: 0
BACK PAIN: 0
CONSTIPATION: 0
COUGH: 0
SORE THROAT: 0
NAUSEA: 0
CHEST TIGHTNESS: 0
SHORTNESS OF BREATH: 0
ABDOMINAL PAIN: 1

## 2024-03-29 NOTE — PROGRESS NOTES
weight at start of adipex was 14.75, pt has lost 29 lbs.   - Continue Adipex as previously prescribed  - I also did stress the importance of exercise to aim for 150 minutes of aerobic activity a week and to watch her diet and calorie intake.  - Will continue with monthly BP and weight checks    - Rest of systems unchanged, continue current treatments.    On this date 3/29/2024 I have spent 30 minutes reviewing previous notes, test results and face to face with the patient discussing the diagnosis and importance of compliance with the treatment plan as well as documenting on the day of the visit.     Amelia GARZA-CNP

## 2024-04-05 ENCOUNTER — HOSPITAL ENCOUNTER (OUTPATIENT)
Dept: ULTRASOUND IMAGING | Age: 24
Discharge: HOME | End: 2024-04-05
Payer: COMMERCIAL

## 2024-04-05 DIAGNOSIS — R10.31 RIGHT LOWER QUADRANT PAIN: ICD-10-CM

## 2024-04-05 DIAGNOSIS — R10.2 PELVIC PAIN: ICD-10-CM

## 2024-04-05 PROCEDURE — 93976 VASCULAR STUDY: CPT

## 2024-06-28 NOTE — PROGRESS NOTES
Amelia Alvarez, KAYLA-CNP  [facility]  [address], [address]  [facility] [address]  Dept: [phone]  Dept Fax: [phone]     Patient ID: Barb Burk is a 24 y.o. female.    HPI    Pt here today for BP and weight check while being on Adipex.  A refill of the medication is needed today.  Pt today denies any HA, chest pain, or SOB. This is month 6 since starting. Since her last visit, she has lost 19 lbs for a cumulative total of 48 lbs.    - denies any side effects, she is walking every night. Feels good    Otherwise patient is doing well on current tx and voices no other concerns today.     The patient's past medical, surgical, social, and family history as well as his current medications and allergies were reviewed as documented in today's encounter by JULIAN Ludwig.    My previous office notes, labs and diagnostic studies were reviewed prior to and during encounter.    Current Outpatient Medications on File Prior to Visit   Medication Sig Dispense Refill    ondansetron (ZOFRAN) 4 MG tablet Take 1 tablet by mouth every 8 hours as needed for Nausea 20 tablet 0    vitamin D (ERGOCALCIFEROL) 1.25 MG (94202 UT) CAPS capsule Take 1 capsule by mouth once a week 4 capsule 3    etonogestrel-ethinyl estradiol (NUVARING) 0.12-0.015 MG/24HR vaginal ring Place 1 each vaginally See Admin Instructions       No current facility-administered medications on file prior to visit.        Subjective:     Review of Systems   Constitutional:  Negative for activity change, fatigue and fever.   HENT:  Negative for congestion, ear pain, rhinorrhea and sore throat.    Respiratory:  Negative for cough, chest tightness and shortness of breath.    Cardiovascular:  Negative for chest pain and palpitations.   Gastrointestinal:  Negative for abdominal distention, abdominal pain, constipation (daily BM), diarrhea and nausea.   Endocrine: Negative for polydipsia, polyphagia and polyuria.

## 2024-07-01 ENCOUNTER — OFFICE VISIT (OUTPATIENT)
Dept: FAMILY MEDICINE CLINIC | Age: 24
End: 2024-07-01
Payer: COMMERCIAL

## 2024-07-01 VITALS
WEIGHT: 251 LBS | OXYGEN SATURATION: 96 % | SYSTOLIC BLOOD PRESSURE: 130 MMHG | DIASTOLIC BLOOD PRESSURE: 72 MMHG | BODY MASS INDEX: 45.91 KG/M2 | HEART RATE: 78 BPM | RESPIRATION RATE: 16 BRPM | TEMPERATURE: 97.8 F

## 2024-07-01 DIAGNOSIS — R63.5 WEIGHT GAIN: ICD-10-CM

## 2024-07-01 PROCEDURE — G8427 DOCREV CUR MEDS BY ELIG CLIN: HCPCS | Performed by: NURSE PRACTITIONER

## 2024-07-01 PROCEDURE — G8417 CALC BMI ABV UP PARAM F/U: HCPCS | Performed by: NURSE PRACTITIONER

## 2024-07-01 PROCEDURE — 1036F TOBACCO NON-USER: CPT | Performed by: NURSE PRACTITIONER

## 2024-07-01 PROCEDURE — 99213 OFFICE O/P EST LOW 20 MIN: CPT | Performed by: NURSE PRACTITIONER

## 2024-07-01 RX ORDER — PHENTERMINE HYDROCHLORIDE 37.5 MG/1
37.5 CAPSULE ORAL EVERY MORNING
Qty: 30 CAPSULE | Refills: 2 | Status: SHIPPED | OUTPATIENT
Start: 2024-07-01 | End: 2024-09-29

## 2024-07-01 ASSESSMENT — ENCOUNTER SYMPTOMS: ABDOMINAL PAIN: 0

## 2024-10-02 ENCOUNTER — OFFICE VISIT (OUTPATIENT)
Dept: FAMILY MEDICINE CLINIC | Age: 24
End: 2024-10-02
Payer: COMMERCIAL

## 2024-10-02 VITALS
WEIGHT: 240 LBS | SYSTOLIC BLOOD PRESSURE: 122 MMHG | TEMPERATURE: 97.6 F | BODY MASS INDEX: 43.9 KG/M2 | RESPIRATION RATE: 16 BRPM | DIASTOLIC BLOOD PRESSURE: 82 MMHG | HEART RATE: 64 BPM | OXYGEN SATURATION: 97 %

## 2024-10-02 DIAGNOSIS — R63.5 WEIGHT GAIN: Primary | ICD-10-CM

## 2024-10-02 PROCEDURE — G8427 DOCREV CUR MEDS BY ELIG CLIN: HCPCS | Performed by: NURSE PRACTITIONER

## 2024-10-02 PROCEDURE — G8484 FLU IMMUNIZE NO ADMIN: HCPCS | Performed by: NURSE PRACTITIONER

## 2024-10-02 PROCEDURE — G8417 CALC BMI ABV UP PARAM F/U: HCPCS | Performed by: NURSE PRACTITIONER

## 2024-10-02 PROCEDURE — 99213 OFFICE O/P EST LOW 20 MIN: CPT | Performed by: NURSE PRACTITIONER

## 2024-10-02 PROCEDURE — 1036F TOBACCO NON-USER: CPT | Performed by: NURSE PRACTITIONER

## 2024-10-02 ASSESSMENT — ENCOUNTER SYMPTOMS
NAUSEA: 0
SHORTNESS OF BREATH: 0
ABDOMINAL PAIN: 0
BACK PAIN: 0
COUGH: 0
SORE THROAT: 0
ABDOMINAL DISTENTION: 0
DIARRHEA: 0
CONSTIPATION: 0
RHINORRHEA: 0
CHEST TIGHTNESS: 0
COLOR CHANGE: 0

## 2024-10-02 NOTE — PROGRESS NOTES
Amelia Alvarez, KAYLA-CNP  [facility] MEDICINE  [address], [address]  [facility] [address]  Dept: [phone]  Dept Fax: [phone]     Patient ID: Barb Burk is a 24 y.o. female.    HPI    Pt here today for BP and weight check while being on Adipex.  A refill of the medication is needed today.  Pt today denies any HA, chest pain, or SOB. This is month 6 since starting. Since her last visit, she has lost 11 lbs for a cumulative total of 59 lbs.    - doing well overall has no complaints.     Otherwise patient is doing well on current tx and voices no other concerns today.     The patient's past medical, surgical, social, and family history as well as his current medications and allergies were reviewed as documented in today's encounter by JULIAN Ludwig.    My previous office notes, labs and diagnostic studies were reviewed prior to and during encounter.    Current Outpatient Medications on File Prior to Visit   Medication Sig Dispense Refill    vitamin D (ERGOCALCIFEROL) 1.25 MG (78662 UT) CAPS capsule Take 1 capsule by mouth once a week 4 capsule 3    etonogestrel-ethinyl estradiol (NUVARING) 0.12-0.015 MG/24HR vaginal ring Place 1 each vaginally See Admin Instructions      ondansetron (ZOFRAN) 4 MG tablet Take 1 tablet by mouth every 8 hours as needed for Nausea (Patient not taking: Reported on 10/2/2024) 20 tablet 0     No current facility-administered medications on file prior to visit.        Subjective:     Review of Systems   Constitutional:  Negative for activity change, fatigue and fever.   HENT:  Negative for congestion, ear pain, rhinorrhea and sore throat.    Respiratory:  Negative for cough, chest tightness and shortness of breath.    Cardiovascular:  Negative for chest pain and palpitations.   Gastrointestinal:  Negative for abdominal distention, abdominal pain, constipation (daily BM), diarrhea and nausea.   Endocrine: Negative for polydipsia,

## 2024-10-28 ENCOUNTER — HOSPITAL ENCOUNTER (EMERGENCY)
Facility: CLINIC | Age: 24
Discharge: HOME | End: 2024-10-28
Attending: EMERGENCY MEDICINE
Payer: COMMERCIAL

## 2024-10-28 VITALS
WEIGHT: 230 LBS | BODY MASS INDEX: 39.27 KG/M2 | DIASTOLIC BLOOD PRESSURE: 106 MMHG | TEMPERATURE: 98.7 F | SYSTOLIC BLOOD PRESSURE: 147 MMHG | HEART RATE: 85 BPM | HEIGHT: 64 IN | OXYGEN SATURATION: 97 % | RESPIRATION RATE: 18 BRPM

## 2024-10-28 DIAGNOSIS — J06.9 VIRAL URI: Primary | ICD-10-CM

## 2024-10-28 PROCEDURE — 93005 ELECTROCARDIOGRAM TRACING: CPT | Performed by: EMERGENCY MEDICINE

## 2024-10-28 PROCEDURE — 99283 EMERGENCY DEPT VISIT LOW MDM: CPT

## 2024-10-28 ASSESSMENT — LIFESTYLE VARIABLES
HOW OFTEN DO YOU HAVE A DRINK CONTAINING ALCOHOL: MONTHLY OR LESS
HOW MANY STANDARD DRINKS CONTAINING ALCOHOL DO YOU HAVE ON A TYPICAL DAY: 1 OR 2

## 2024-10-29 LAB
EKG ATRIAL RATE: 88 BPM
EKG P AXIS: 17 DEGREES
EKG P-R INTERVAL: 122 MS
EKG Q-T INTERVAL: 356 MS
EKG QRS DURATION: 78 MS
EKG QTC CALCULATION (BAZETT): 430 MS
EKG R AXIS: 38 DEGREES
EKG T AXIS: 38 DEGREES
EKG VENTRICULAR RATE: 88 BPM

## 2024-10-29 NOTE — ED PROVIDER NOTES
[facility]  [address] [facility] [address] [facility] [address]  Phone: [phone]      Pt Name: Barb Burk  MRN:[id number]  Birthdate 2000  Date of evaluation: 10/28/2024      CHIEF COMPLAINT       Chief Complaint   Patient presents with    Dizziness    Headache    Fatigue     Generalized fatigue today and runny nose and then started getting a headache and dizziness       HISTORY OF PRESENT ILLNESS   Barb Burk is a 24 y.o. female who presents for evaluation of upper respiratory symptoms.  The patient reports that she woke up this morning with cold-like symptoms including congestion, fatigue, myalgias, chest heaviness, and malaise.  She states that for the past 2 hours she has felt dizzy and lightheaded with standing.  The patient has not been drinking much fluid today.  She took ibuprofen with some improvement.  The patient denies any personal or family history of cardiac disease or blood clots.  She denies recent travel, recent surgery, hemoptysis, estrogen use, calf tenderness or swelling.    REVIEW OF SYSTEMS     Positive: Congestion, fatigue, myalgias, chest heaviness, malaise, dizziness, lightheadedness  Ten point review of systems was reviewed and is negative unless otherwise noted in the HPI    PAST MEDICAL HISTORY    has a past medical history of Obesity.    SURGICAL HISTORY      has no past surgical history on file.  Patient denies    CURRENT MEDICATIONS       Discharge Medication List as of 10/28/2024 10:03 PM        CONTINUE these medications which have NOT CHANGED    Details   vitamin D (ERGOCALCIFEROL) 1.25 MG (83912 UT) CAPS capsule Take 1 capsule by mouth once a week, Disp-4 capsule, R-3Normal      etonogestrel-ethinyl estradiol (NUVARING) 0.12-0.015 MG/24HR vaginal ring Place 1 each vaginally See Admin InstructionsHistorical Med             ALLERGIES     has No Known Allergies.    FAMILY HISTORY     has no family status information on file.      family history is not on file.    SOCIAL

## 2025-04-06 ASSESSMENT — PATIENT HEALTH QUESTIONNAIRE - PHQ9
SUM OF ALL RESPONSES TO PHQ QUESTIONS 1-9: 0
SUM OF ALL RESPONSES TO PHQ9 QUESTIONS 1 & 2: 0
SUM OF ALL RESPONSES TO PHQ QUESTIONS 1-9: 0
1. LITTLE INTEREST OR PLEASURE IN DOING THINGS: NOT AT ALL
2. FEELING DOWN, DEPRESSED OR HOPELESS: NOT AT ALL
1. LITTLE INTEREST OR PLEASURE IN DOING THINGS: NOT AT ALL
SUM OF ALL RESPONSES TO PHQ QUESTIONS 1-9: 0
SUM OF ALL RESPONSES TO PHQ QUESTIONS 1-9: 0
2. FEELING DOWN, DEPRESSED OR HOPELESS: NOT AT ALL

## 2025-04-07 ENCOUNTER — OFFICE VISIT (OUTPATIENT)
Dept: FAMILY MEDICINE CLINIC | Age: 25
End: 2025-04-07
Payer: COMMERCIAL

## 2025-04-07 ENCOUNTER — RESULTS FOLLOW-UP (OUTPATIENT)
Dept: FAMILY MEDICINE CLINIC | Age: 25
End: 2025-04-07

## 2025-04-07 ENCOUNTER — HOSPITAL ENCOUNTER (OUTPATIENT)
Age: 25
Setting detail: SPECIMEN
Discharge: HOME | End: 2025-04-07

## 2025-04-07 VITALS
SYSTOLIC BLOOD PRESSURE: 132 MMHG | WEIGHT: 268 LBS | OXYGEN SATURATION: 95 % | TEMPERATURE: 97.4 F | BODY MASS INDEX: 46 KG/M2 | DIASTOLIC BLOOD PRESSURE: 88 MMHG | HEART RATE: 88 BPM | RESPIRATION RATE: 16 BRPM

## 2025-04-07 DIAGNOSIS — Z00.00 PREVENTATIVE HEALTH CARE: ICD-10-CM

## 2025-04-07 DIAGNOSIS — E55.9 VITAMIN D DEFICIENCY: ICD-10-CM

## 2025-04-07 DIAGNOSIS — Z00.00 ANNUAL PHYSICAL EXAM: Primary | ICD-10-CM

## 2025-04-07 DIAGNOSIS — R63.5 WEIGHT GAIN: ICD-10-CM

## 2025-04-07 LAB
25(OH)D3 SERPL-MCNC: 24.8 NG/ML (ref 30–100)
ALBUMIN SERPL-MCNC: 3.9 G/DL (ref 3.5–5.2)
ALBUMIN/GLOB SERPL: 1.2 {RATIO} (ref 1–2.5)
ALP SERPL-CCNC: 63 U/L (ref 35–104)
ALT SERPL-CCNC: 11 U/L (ref 10–35)
ANION GAP SERPL CALCULATED.3IONS-SCNC: 12 MMOL/L (ref 9–16)
AST SERPL-CCNC: 14 U/L (ref 10–35)
BILIRUB SERPL-MCNC: <0.2 MG/DL (ref 0–1.2)
BUN SERPL-MCNC: 10 MG/DL (ref 6–20)
CALCIUM SERPL-MCNC: 9.1 MG/DL (ref 8.6–10.4)
CHLORIDE SERPL-SCNC: 105 MMOL/L (ref 98–107)
CHOLEST SERPL-MCNC: 132 MG/DL (ref 0–199)
CHOLESTEROL/HDL RATIO: 2.8
CO2 SERPL-SCNC: 23 MMOL/L (ref 20–31)
CREAT SERPL-MCNC: 0.6 MG/DL (ref 0.6–0.9)
ERYTHROCYTE [DISTWIDTH] IN BLOOD BY AUTOMATED COUNT: 13.1 % (ref 11.8–14.4)
EST. AVERAGE GLUCOSE BLD GHB EST-MCNC: 100 MG/DL
GFR, ESTIMATED: >90 ML/MIN/1.73M2
GLUCOSE SERPL-MCNC: 98 MG/DL (ref 74–99)
HBA1C MFR BLD: 5.1 % (ref 4–6)
HCT VFR BLD AUTO: 39 % (ref 36.3–47.1)
HDLC SERPL-MCNC: 48 MG/DL
HGB BLD-MCNC: 12.6 G/DL (ref 11.9–15.1)
LDLC SERPL CALC-MCNC: 65 MG/DL (ref 0–100)
MCH RBC QN AUTO: 28.1 PG (ref 25.2–33.5)
MCHC RBC AUTO-ENTMCNC: 32.3 G/DL (ref 28.4–34.8)
MCV RBC AUTO: 86.9 FL (ref 82.6–102.9)
NRBC BLD-RTO: 0 PER 100 WBC
PLATELET # BLD AUTO: 402 K/UL (ref 138–453)
PMV BLD AUTO: 9.7 FL (ref 8.1–13.5)
POTASSIUM SERPL-SCNC: 4.4 MMOL/L (ref 3.7–5.3)
PROT SERPL-MCNC: 7.2 G/DL (ref 6.6–8.7)
RBC # BLD AUTO: 4.49 M/UL (ref 3.95–5.11)
SODIUM SERPL-SCNC: 140 MMOL/L (ref 136–145)
TRIGL SERPL-MCNC: 97 MG/DL
TSH SERPL DL<=0.05 MIU/L-ACNC: 2.65 UIU/ML (ref 0.27–4.2)
VLDLC SERPL CALC-MCNC: 19 MG/DL (ref 1–30)
WBC OTHER # BLD: 8.1 K/UL (ref 3.5–11.3)

## 2025-04-07 PROCEDURE — 99395 PREV VISIT EST AGE 18-39: CPT | Performed by: NURSE PRACTITIONER

## 2025-04-07 RX ORDER — DROSPIRENONE AND ETHINYL ESTRADIOL 0.03MG-3MG
1 KIT ORAL DAILY
COMMUNITY
Start: 2025-01-08

## 2025-04-07 RX ORDER — PHENTERMINE HYDROCHLORIDE 37.5 MG/1
37.5 CAPSULE ORAL EVERY MORNING
Qty: 30 CAPSULE | Refills: 0 | Status: SHIPPED | OUTPATIENT
Start: 2025-04-07 | End: 2025-05-07

## 2025-04-07 SDOH — ECONOMIC STABILITY: FOOD INSECURITY: WITHIN THE PAST 12 MONTHS, YOU WORRIED THAT YOUR FOOD WOULD RUN OUT BEFORE YOU GOT MONEY TO BUY MORE.: NEVER TRUE

## 2025-04-07 SDOH — ECONOMIC STABILITY: FOOD INSECURITY: WITHIN THE PAST 12 MONTHS, THE FOOD YOU BOUGHT JUST DIDN'T LAST AND YOU DIDN'T HAVE MONEY TO GET MORE.: NEVER TRUE

## 2025-04-07 NOTE — PATIENT INSTRUCTIONS
- Get labs done a few days before next appointment. You will want to be fasting which means nothing to eat or drink for 12 hours before completing labs.       Health Maintenance Recommendations  Exercise   I generally recommend that people of all ages try to get 150 minutes of physical activity per week and it doesn’t matter how this totals up, in other words 30 minutes 5 days per week is as good as 50 minutes 3 days a week and so on.    The level of activity should be such that it is able to get your heart rate up to 100 or more, for example a brisk walk should achieve this rate.   Dietary Recommendations  In terms of diet, I generally recommend trying to eat a healthy well balanced diet full of fruits and vegetables. Avoid carbonated drinks and fruit juices and limit your alcohol use.   Avoid processed foods wherever possible (anything that comes in a can or a box) which can be achieved by sticking to the outside walls of the grocery store where generally you will find fresh fruits/vegetables, meats, dairy, and frozen foods.    Try to avoid starches in the diet where possible and minimize bread, rice, potatoes, and pasta in the diet.  Specifically try to avoid gluten, which even in people that don’t have a vijay allergy, causes havoc in the small intestine and alters absorption of nutrients which can in turn lead to obesity.   Sleep  Try to achieve a regular sleep schedule, waking and laying down at the same time each night.  Most people need 7 hours per night plus or minus 2 hours.    You will know that you’re getting enough because you will wake feeling refreshed and not need to sleep in to catch up on weekends.   Skin Care  Make sure that you don’t neglect your skin.    Play it safe in the sun. Use a sunblock on all of your exposed skin.   The sunblock should be broad spectrum and water resistant.    I do recommend an SPF 30 or higher sun screen any time that you plan to be in the sun for more than 20 minutes,

## 2025-04-07 NOTE — PROGRESS NOTES
normal.      Mouth/Throat:      Mouth: Mucous membranes are moist.      Pharynx: No oropharyngeal exudate or posterior oropharyngeal erythema.   Eyes:      Extraocular Movements: Extraocular movements intact.      Conjunctiva/sclera: Conjunctivae normal.      Pupils: Pupils are equal, round, and reactive to light.   Cardiovascular:      Rate and Rhythm: Normal rate and regular rhythm.      Pulses: Normal pulses.      Heart sounds: Normal heart sounds. No murmur heard.  Pulmonary:      Effort: Pulmonary effort is normal. No respiratory distress.      Breath sounds: Normal breath sounds. No wheezing or rales.   Abdominal:      General: Bowel sounds are normal. There is no distension.      Palpations: Abdomen is soft.      Tenderness: There is no abdominal tenderness.   Musculoskeletal:         General: Normal range of motion.      Cervical back: Normal range of motion.      Right lower leg: No edema.      Left lower leg: No edema.   Lymphadenopathy:      Head:      Right side of head: No submental, submandibular, tonsillar, preauricular, posterior auricular or occipital adenopathy.      Left side of head: No submental, submandibular, tonsillar, preauricular, posterior auricular or occipital adenopathy.      Cervical: No cervical adenopathy.      Right cervical: No superficial, deep or posterior cervical adenopathy.     Left cervical: No superficial, deep or posterior cervical adenopathy.   Skin:     General: Skin is warm and dry.      Capillary Refill: Capillary refill takes less than 2 seconds.   Neurological:      General: No focal deficit present.      Mental Status: She is alert and oriented to person, place, and time.      Cranial Nerves: Cranial nerves 2-12 are intact.      Sensory: Sensation is intact.      Motor: Motor function is intact.      Coordination: Coordination is intact.      Gait: Gait is intact.      Deep Tendon Reflexes: Reflexes normal.   Psychiatric:         Attention and Perception: Attention

## 2025-04-17 ENCOUNTER — TELEMEDICINE (OUTPATIENT)
Dept: FAMILY MEDICINE CLINIC | Age: 25
End: 2025-04-17
Payer: COMMERCIAL

## 2025-04-17 DIAGNOSIS — J01.90 ACUTE BACTERIAL SINUSITIS: Primary | ICD-10-CM

## 2025-04-17 DIAGNOSIS — B96.89 ACUTE BACTERIAL SINUSITIS: Primary | ICD-10-CM

## 2025-04-17 PROCEDURE — 99213 OFFICE O/P EST LOW 20 MIN: CPT | Performed by: NURSE PRACTITIONER

## 2025-04-17 PROCEDURE — G8427 DOCREV CUR MEDS BY ELIG CLIN: HCPCS | Performed by: NURSE PRACTITIONER

## 2025-04-17 RX ORDER — FLUTICASONE PROPIONATE 50 MCG
2 SPRAY, SUSPENSION (ML) NASAL NIGHTLY
Qty: 48 G | Refills: 1 | Status: SHIPPED | OUTPATIENT
Start: 2025-04-17

## 2025-04-17 RX ORDER — AMOXICILLIN 500 MG/1
500 CAPSULE ORAL 2 TIMES DAILY
Qty: 14 CAPSULE | Refills: 0 | Status: SHIPPED | OUTPATIENT
Start: 2025-04-17 | End: 2025-04-24

## 2025-04-17 ASSESSMENT — ENCOUNTER SYMPTOMS
SINUS PRESSURE: 1
SINUS PAIN: 1
RHINORRHEA: 1
ABDOMINAL DISTENTION: 0
COUGH: 0
SORE THROAT: 1
NAUSEA: 0
COLOR CHANGE: 0
CHEST TIGHTNESS: 0
CONSTIPATION: 0
BACK PAIN: 0
ABDOMINAL PAIN: 0
DIARRHEA: 0
SHORTNESS OF BREATH: 0

## 2025-04-17 NOTE — PROGRESS NOTES
Amelia Alvarez, APRN-CNP  [facility]  [facility] MEDICINE  [address], [address]  [facility] [address]  Dept: [phone]  Dept Fax: [phone]     Patient ID: Barb Burk is a 25 y.o. female Established patient.    HPI      History of Present Illness  The patient presents for evaluation of congestion, scratchy throat, and left ear pain.    Symptoms began to worsen last night, characterized by significant congestion, a scratchy throat, and soreness/ pain in the left ear. A dry cough and postnasal drip are reported. She has been at a  around a lot of people within the past few days. No fevers, tinnitus, sneezing, headaches, lightheadedness, chest pain, or shortness of breath are experienced. No known allergies are reported.      Otherwise pt doing well on current tx and no other concerns today.     The patient's past medical, surgical, social, and family history as well as his current medications and allergies were reviewed as documented in today's encounter by JULIAN Ludwig.    Previous office notes, labs, imaging and hospital records were reviewed prior to and during encounter.    Current Outpatient Medications on File Prior to Visit   Medication Sig Dispense Refill    drospirenone-ethinyl estradiol 3-0.03 MG TABS Take 1 tablet by mouth daily      phentermine (ADIPEX-P) 37.5 MG capsule Take 1 capsule by mouth every morning for 30 days. Max Daily Amount: 37.5 mg 30 capsule 0     No current facility-administered medications on file prior to visit.        Subjective:     Review of Systems   Constitutional:  Negative for activity change, fatigue and fever.   HENT:  Positive for congestion, ear pain, postnasal drip, rhinorrhea, sinus pressure, sinus pain and sore throat. Negative for sneezing and tinnitus.    Respiratory:  Negative for cough, chest tightness and shortness of breath.    Cardiovascular:  Negative for chest pain and palpitations.

## 2025-05-04 NOTE — PROGRESS NOTES
Orders   1. BMI 45.0-49.9, adult (HCC)        2. Weight gain  phentermine (ADIPEX-P) 37.5 MG capsule          Plan:     - Doing well, as she has lost 9 lbs since her last visit  - BP and HR noted on today's visit.  - She has been taking the Adipex as prescribed and tolerating it without significant side effects.   - Continue with Adipex 37.5 mg daily as previously prescribed.  - This will start month 2/3  - A 5 % reduction in weight will be 13.4  - I also did stress the importance of exercise to aim for 150 minutes of aerobic activity a week and to watch her diet and calorie intake.  - Will continue with monthly BP and weight checks.    - Rest of systems unchanged, continue current treatments.    On this date 5/5/2025 I have spent greater than 50% of the time reviewing previous notes, test results and face to face with the patient discussing the diagnosis and importance of compliance with the treatment plan as well as documenting on the day of the visit.     Amleia GARZA-CNP

## 2025-05-05 ENCOUNTER — OFFICE VISIT (OUTPATIENT)
Dept: FAMILY MEDICINE CLINIC | Age: 25
End: 2025-05-05
Payer: COMMERCIAL

## 2025-05-05 VITALS
BODY MASS INDEX: 44.46 KG/M2 | SYSTOLIC BLOOD PRESSURE: 132 MMHG | WEIGHT: 259 LBS | OXYGEN SATURATION: 95 % | RESPIRATION RATE: 16 BRPM | DIASTOLIC BLOOD PRESSURE: 82 MMHG | TEMPERATURE: 97.2 F | HEART RATE: 83 BPM

## 2025-05-05 DIAGNOSIS — R63.5 WEIGHT GAIN: ICD-10-CM

## 2025-05-05 PROCEDURE — 99213 OFFICE O/P EST LOW 20 MIN: CPT | Performed by: NURSE PRACTITIONER

## 2025-05-05 PROCEDURE — G8427 DOCREV CUR MEDS BY ELIG CLIN: HCPCS | Performed by: NURSE PRACTITIONER

## 2025-05-05 PROCEDURE — G8417 CALC BMI ABV UP PARAM F/U: HCPCS | Performed by: NURSE PRACTITIONER

## 2025-05-05 PROCEDURE — 1036F TOBACCO NON-USER: CPT | Performed by: NURSE PRACTITIONER

## 2025-05-05 RX ORDER — PHENTERMINE HYDROCHLORIDE 37.5 MG/1
37.5 CAPSULE ORAL EVERY MORNING
Qty: 30 CAPSULE | Refills: 0 | Status: SHIPPED | OUTPATIENT
Start: 2025-05-05 | End: 2025-06-04

## 2025-06-04 ENCOUNTER — OFFICE VISIT (OUTPATIENT)
Dept: FAMILY MEDICINE CLINIC | Age: 25
End: 2025-06-04
Payer: COMMERCIAL

## 2025-06-04 VITALS
WEIGHT: 255 LBS | BODY MASS INDEX: 43.77 KG/M2 | SYSTOLIC BLOOD PRESSURE: 136 MMHG | HEART RATE: 97 BPM | DIASTOLIC BLOOD PRESSURE: 88 MMHG | OXYGEN SATURATION: 96 % | RESPIRATION RATE: 16 BRPM | TEMPERATURE: 97.2 F

## 2025-06-04 DIAGNOSIS — H65.92 LEFT OTITIS MEDIA WITH EFFUSION: ICD-10-CM

## 2025-06-04 DIAGNOSIS — R63.5 WEIGHT GAIN: Primary | ICD-10-CM

## 2025-06-04 PROCEDURE — 99213 OFFICE O/P EST LOW 20 MIN: CPT | Performed by: NURSE PRACTITIONER

## 2025-06-04 PROCEDURE — 1036F TOBACCO NON-USER: CPT | Performed by: NURSE PRACTITIONER

## 2025-06-04 PROCEDURE — G8427 DOCREV CUR MEDS BY ELIG CLIN: HCPCS | Performed by: NURSE PRACTITIONER

## 2025-06-04 PROCEDURE — G8417 CALC BMI ABV UP PARAM F/U: HCPCS | Performed by: NURSE PRACTITIONER

## 2025-06-04 RX ORDER — PHENTERMINE HYDROCHLORIDE 37.5 MG/1
37.5 CAPSULE ORAL EVERY MORNING
Qty: 30 CAPSULE | Refills: 0 | Status: SHIPPED | OUTPATIENT
Start: 2025-06-04 | End: 2025-07-04

## 2025-06-04 RX ORDER — CEFDINIR 300 MG/1
300 CAPSULE ORAL 2 TIMES DAILY
Qty: 10 CAPSULE | Refills: 0 | Status: SHIPPED | OUTPATIENT
Start: 2025-06-04 | End: 2025-06-09

## 2025-06-04 ASSESSMENT — ENCOUNTER SYMPTOMS
NAUSEA: 0
CONSTIPATION: 0
COLOR CHANGE: 0
SHORTNESS OF BREATH: 0
RHINORRHEA: 1
BACK PAIN: 0
ABDOMINAL PAIN: 0
SORE THROAT: 0
SINUS PRESSURE: 1
ABDOMINAL DISTENTION: 0
CHEST TIGHTNESS: 0
SINUS PAIN: 0
DIARRHEA: 0
COUGH: 0

## 2025-06-04 NOTE — PROGRESS NOTES
Amelia Alvarez, APRN-CNP  [facility]  [facility]  [address], [address]  [facility] [address]  Dept: [phone]  Dept Fax: [phone]     Patient ID: Barb Burk is a 25 y.o. female.    HPI    Pt here today for BP and weight check while being on Adipex.  A refill of the medication is needed today. Pt denies any fever or chills.  Pt today denies any HA, chest pain, or SOB.  Pt denies any N/V/D/C or abdominal pain. This is month 1 since starting. Since her last visit, she has lost 4 lbs for a cumulative total of 13 lbs.    Bad allergies that seem to be worsening she has been having sinus congestion, drainage to the throat, runny nose, sinus pressure and now she is having extreme ear pain/pressure.     Otherwise patient is doing well on current tx and voices no other concerns today.     The patient's past medical, surgical, social, and family history as well as his current medications and allergies were reviewed as documented in today's encounter by JULIAN Ludwig.    My previous office notes, labs and diagnostic studies were reviewed prior to and during encounter.     Current Outpatient Medications on File Prior to Visit   Medication Sig Dispense Refill    phentermine (ADIPEX-P) 37.5 MG capsule Take 1 capsule by mouth every morning for 30 days. Max Daily Amount: 37.5 mg 30 capsule 0    fluticasone (FLONASE) 50 MCG/ACT nasal spray 2 sprays by Each Nostril route at bedtime 48 g 1    drospirenone-ethinyl estradiol 3-0.03 MG TABS Take 1 tablet by mouth daily       No current facility-administered medications on file prior to visit.        Subjective:     Review of Systems   Constitutional:  Negative for activity change, fatigue and fever.   HENT:  Positive for congestion, ear pain, postnasal drip, rhinorrhea and sinus pressure. Negative for sinus pain and sore throat.    Respiratory:  Negative for cough, chest tightness and shortness of breath.

## 2025-07-01 ASSESSMENT — ENCOUNTER SYMPTOMS
BACK PAIN: 0
CONSTIPATION: 0
DIARRHEA: 0
COLOR CHANGE: 0
SINUS PAIN: 0
COUGH: 0
SHORTNESS OF BREATH: 0
CHEST TIGHTNESS: 0
ABDOMINAL PAIN: 0
NAUSEA: 0
SORE THROAT: 0
ABDOMINAL DISTENTION: 0

## 2025-07-01 NOTE — PROGRESS NOTES
Amelia Alvarez, APRN-CNP  [facility]  [address], [address]  [facility] [address]  Dept: [phone]  Dept Fax: [phone]     Patient ID: Barb Burk is a 25 y.o. female.    HPI    Pt here today for BP and weight check while being on Adipex.  A refill of the medication is needed today. Pt denies any fever or chills.  Pt today denies any HA, chest pain, or SOB.  Pt denies any N/V/D/C or abdominal pain. This is month 3 since starting. Since her last visit, she has lost 5 lbs for a cumulative total of 18 lbs.     Otherwise patient is doing well on current tx and voices no other concerns today.     The patient's past medical, surgical, social, and family history as well as his current medications and allergies were reviewed as documented in today's encounter by JULIAN Ludwig.    My previous office notes, labs and diagnostic studies were reviewed prior to and during encounter.     Current Outpatient Medications on File Prior to Visit   Medication Sig Dispense Refill    phentermine (ADIPEX-P) 37.5 MG capsule Take 1 capsule by mouth every morning for 30 days. Max Daily Amount: 37.5 mg 30 capsule 0    fluticasone (FLONASE) 50 MCG/ACT nasal spray 2 sprays by Each Nostril route at bedtime 48 g 1    drospirenone-ethinyl estradiol 3-0.03 MG TABS Take 1 tablet by mouth daily       No current facility-administered medications on file prior to visit.        Subjective:     Review of Systems   Constitutional:  Negative for activity change, fatigue and fever.   HENT:  Negative for congestion, ear pain, postnasal drip, rhinorrhea, sinus pressure, sinus pain and sore throat.    Respiratory:  Negative for cough, chest tightness and shortness of breath.    Cardiovascular:  Negative for chest pain and palpitations.   Gastrointestinal:  Negative for abdominal distention, abdominal pain, constipation, diarrhea and nausea.   Endocrine: Negative for polydipsia, polyphagia and

## 2025-07-02 ENCOUNTER — OFFICE VISIT (OUTPATIENT)
Dept: FAMILY MEDICINE CLINIC | Age: 25
End: 2025-07-02
Payer: COMMERCIAL

## 2025-07-02 VITALS
WEIGHT: 250 LBS | OXYGEN SATURATION: 96 % | HEART RATE: 96 BPM | TEMPERATURE: 98.1 F | BODY MASS INDEX: 42.91 KG/M2 | RESPIRATION RATE: 16 BRPM | SYSTOLIC BLOOD PRESSURE: 128 MMHG | DIASTOLIC BLOOD PRESSURE: 87 MMHG

## 2025-07-02 DIAGNOSIS — R63.5 WEIGHT GAIN: Primary | ICD-10-CM

## 2025-07-02 PROCEDURE — 1036F TOBACCO NON-USER: CPT | Performed by: NURSE PRACTITIONER

## 2025-07-02 PROCEDURE — G8427 DOCREV CUR MEDS BY ELIG CLIN: HCPCS | Performed by: NURSE PRACTITIONER

## 2025-07-02 PROCEDURE — 99213 OFFICE O/P EST LOW 20 MIN: CPT | Performed by: NURSE PRACTITIONER

## 2025-07-02 PROCEDURE — G8417 CALC BMI ABV UP PARAM F/U: HCPCS | Performed by: NURSE PRACTITIONER

## 2025-07-02 RX ORDER — PHENTERMINE HYDROCHLORIDE 37.5 MG/1
37.5 CAPSULE ORAL EVERY MORNING
Qty: 30 CAPSULE | Refills: 2 | Status: SHIPPED | OUTPATIENT
Start: 2025-07-02 | End: 2025-09-30

## 2025-07-02 ASSESSMENT — ENCOUNTER SYMPTOMS
RHINORRHEA: 0
SINUS PRESSURE: 0